# Patient Record
Sex: FEMALE | Race: OTHER | HISPANIC OR LATINO | Employment: UNEMPLOYED | URBAN - NONMETROPOLITAN AREA
[De-identification: names, ages, dates, MRNs, and addresses within clinical notes are randomized per-mention and may not be internally consistent; named-entity substitution may affect disease eponyms.]

---

## 2023-08-31 ENCOUNTER — APPOINTMENT (EMERGENCY)
Dept: RADIOLOGY | Facility: HOSPITAL | Age: 38
End: 2023-08-31
Payer: COMMERCIAL

## 2023-08-31 ENCOUNTER — APPOINTMENT (EMERGENCY)
Dept: CT IMAGING | Facility: HOSPITAL | Age: 38
End: 2023-08-31
Payer: COMMERCIAL

## 2023-08-31 ENCOUNTER — HOSPITAL ENCOUNTER (EMERGENCY)
Facility: HOSPITAL | Age: 38
Discharge: HOME | End: 2023-08-31
Attending: EMERGENCY MEDICINE | Admitting: EMERGENCY MEDICINE
Payer: COMMERCIAL

## 2023-08-31 VITALS
TEMPERATURE: 99 F | HEART RATE: 84 BPM | DIASTOLIC BLOOD PRESSURE: 87 MMHG | SYSTOLIC BLOOD PRESSURE: 162 MMHG | RESPIRATION RATE: 19 BRPM | OXYGEN SATURATION: 98 %

## 2023-08-31 DIAGNOSIS — R07.9 CHEST PAIN: ICD-10-CM

## 2023-08-31 DIAGNOSIS — R10.84 GENERALIZED ABDOMINAL PAIN: Primary | ICD-10-CM

## 2023-08-31 LAB
2HR DELTA HS TROPONIN: 0 NG/L
ALBUMIN SERPL BCP-MCNC: 4.3 G/DL (ref 3.5–5)
ALP SERPL-CCNC: 63 U/L (ref 34–104)
ALT SERPL W P-5'-P-CCNC: 14 U/L (ref 7–52)
ANION GAP SERPL CALCULATED.3IONS-SCNC: 12 MMOL/L
AST SERPL W P-5'-P-CCNC: 29 U/L (ref 13–39)
BASOPHILS # BLD AUTO: 0.04 THOUSANDS/ÂΜL (ref 0–0.1)
BASOPHILS NFR BLD AUTO: 0 % (ref 0–1)
BILIRUB SERPL-MCNC: 0.31 MG/DL (ref 0.2–1)
BILIRUB UR QL STRIP: NEGATIVE
BUN SERPL-MCNC: 10 MG/DL (ref 5–25)
CALCIUM SERPL-MCNC: 10.1 MG/DL (ref 8.4–10.2)
CARDIAC TROPONIN I PNL SERPL HS: 6 NG/L
CARDIAC TROPONIN I PNL SERPL HS: 6 NG/L
CHLORIDE SERPL-SCNC: 97 MMOL/L (ref 96–108)
CLARITY UR: CLEAR
CO2 SERPL-SCNC: 23 MMOL/L (ref 21–32)
COLOR UR: YELLOW
CREAT SERPL-MCNC: 0.82 MG/DL (ref 0.6–1.3)
EOSINOPHIL # BLD AUTO: 0.19 THOUSAND/ÂΜL (ref 0–0.61)
EOSINOPHIL NFR BLD AUTO: 2 % (ref 0–6)
ERYTHROCYTE [DISTWIDTH] IN BLOOD BY AUTOMATED COUNT: 13.6 % (ref 11.6–15.1)
EXT PREGNANCY TEST URINE: NEGATIVE
EXT. CONTROL: NORMAL
FLUAV RNA RESP QL NAA+PROBE: NEGATIVE
FLUBV RNA RESP QL NAA+PROBE: NEGATIVE
GFR SERPL CREATININE-BSD FRML MDRD: 91 ML/MIN/1.73SQ M
GLUCOSE SERPL-MCNC: 100 MG/DL (ref 65–140)
GLUCOSE SERPL-MCNC: 110 MG/DL (ref 65–140)
GLUCOSE UR STRIP-MCNC: NEGATIVE MG/DL
HCT VFR BLD AUTO: 43.6 % (ref 34.8–46.1)
HGB BLD-MCNC: 14.8 G/DL (ref 11.5–15.4)
HGB UR QL STRIP.AUTO: NEGATIVE
IMM GRANULOCYTES # BLD AUTO: 0.02 THOUSAND/UL (ref 0–0.2)
IMM GRANULOCYTES NFR BLD AUTO: 0 % (ref 0–2)
KETONES UR STRIP-MCNC: NEGATIVE MG/DL
LACTATE SERPL-SCNC: 2.1 MMOL/L (ref 0.5–2)
LACTATE SERPL-SCNC: 2.1 MMOL/L (ref 0.5–2)
LEUKOCYTE ESTERASE UR QL STRIP: NEGATIVE
LIPASE SERPL-CCNC: 32 U/L (ref 11–82)
LYMPHOCYTES # BLD AUTO: 3.92 THOUSANDS/ÂΜL (ref 0.6–4.47)
LYMPHOCYTES NFR BLD AUTO: 42 % (ref 14–44)
MCH RBC QN AUTO: 29.4 PG (ref 26.8–34.3)
MCHC RBC AUTO-ENTMCNC: 33.9 G/DL (ref 31.4–37.4)
MCV RBC AUTO: 87 FL (ref 82–98)
MONOCYTES # BLD AUTO: 0.76 THOUSAND/ÂΜL (ref 0.17–1.22)
MONOCYTES NFR BLD AUTO: 8 % (ref 4–12)
NEUTROPHILS # BLD AUTO: 4.39 THOUSANDS/ÂΜL (ref 1.85–7.62)
NEUTS SEG NFR BLD AUTO: 48 % (ref 43–75)
NITRITE UR QL STRIP: NEGATIVE
NRBC BLD AUTO-RTO: 0 /100 WBCS
PH UR STRIP.AUTO: 7 [PH]
PLATELET # BLD AUTO: 408 THOUSANDS/UL (ref 149–390)
PMV BLD AUTO: 9.7 FL (ref 8.9–12.7)
POTASSIUM SERPL-SCNC: 3.9 MMOL/L (ref 3.5–5.3)
PROT SERPL-MCNC: 7.5 G/DL (ref 6.4–8.4)
PROT UR STRIP-MCNC: NEGATIVE MG/DL
RBC # BLD AUTO: 5.04 MILLION/UL (ref 3.81–5.12)
RSV RNA RESP QL NAA+PROBE: NEGATIVE
SARS-COV-2 RNA RESP QL NAA+PROBE: NEGATIVE
SODIUM SERPL-SCNC: 132 MMOL/L (ref 135–147)
SP GR UR STRIP.AUTO: <=1.005 (ref 1–1.03)
UROBILINOGEN UR QL STRIP.AUTO: 0.2 E.U./DL
WBC # BLD AUTO: 9.32 THOUSAND/UL (ref 4.31–10.16)

## 2023-08-31 PROCEDURE — 82948 REAGENT STRIP/BLOOD GLUCOSE: CPT

## 2023-08-31 PROCEDURE — 99285 EMERGENCY DEPT VISIT HI MDM: CPT

## 2023-08-31 PROCEDURE — 96361 HYDRATE IV INFUSION ADD-ON: CPT

## 2023-08-31 PROCEDURE — 96374 THER/PROPH/DIAG INJ IV PUSH: CPT

## 2023-08-31 PROCEDURE — 80053 COMPREHEN METABOLIC PANEL: CPT | Performed by: EMERGENCY MEDICINE

## 2023-08-31 PROCEDURE — 81003 URINALYSIS AUTO W/O SCOPE: CPT | Performed by: EMERGENCY MEDICINE

## 2023-08-31 PROCEDURE — G1004 CDSM NDSC: HCPCS

## 2023-08-31 PROCEDURE — 84484 ASSAY OF TROPONIN QUANT: CPT | Performed by: EMERGENCY MEDICINE

## 2023-08-31 PROCEDURE — 83605 ASSAY OF LACTIC ACID: CPT | Performed by: EMERGENCY MEDICINE

## 2023-08-31 PROCEDURE — 85025 COMPLETE CBC W/AUTO DIFF WBC: CPT | Performed by: EMERGENCY MEDICINE

## 2023-08-31 PROCEDURE — 74177 CT ABD & PELVIS W/CONTRAST: CPT

## 2023-08-31 PROCEDURE — 36415 COLL VENOUS BLD VENIPUNCTURE: CPT | Performed by: EMERGENCY MEDICINE

## 2023-08-31 PROCEDURE — 71045 X-RAY EXAM CHEST 1 VIEW: CPT

## 2023-08-31 PROCEDURE — 96375 TX/PRO/DX INJ NEW DRUG ADDON: CPT

## 2023-08-31 PROCEDURE — 99285 EMERGENCY DEPT VISIT HI MDM: CPT | Performed by: EMERGENCY MEDICINE

## 2023-08-31 PROCEDURE — 83690 ASSAY OF LIPASE: CPT | Performed by: EMERGENCY MEDICINE

## 2023-08-31 PROCEDURE — 81025 URINE PREGNANCY TEST: CPT | Performed by: EMERGENCY MEDICINE

## 2023-08-31 PROCEDURE — 0241U HB NFCT DS VIR RESP RNA 4 TRGT: CPT | Performed by: EMERGENCY MEDICINE

## 2023-08-31 RX ORDER — FAMOTIDINE 20 MG/1
20 TABLET, FILM COATED ORAL 2 TIMES DAILY
Qty: 30 TABLET | Refills: 0 | Status: SHIPPED | OUTPATIENT
Start: 2023-08-31

## 2023-08-31 RX ORDER — KETOROLAC TROMETHAMINE 30 MG/ML
15 INJECTION, SOLUTION INTRAMUSCULAR; INTRAVENOUS ONCE
Status: COMPLETED | OUTPATIENT
Start: 2023-08-31 | End: 2023-08-31

## 2023-08-31 RX ORDER — METOCLOPRAMIDE HYDROCHLORIDE 5 MG/ML
5 INJECTION INTRAMUSCULAR; INTRAVENOUS ONCE
Status: COMPLETED | OUTPATIENT
Start: 2023-08-31 | End: 2023-08-31

## 2023-08-31 RX ADMIN — IOHEXOL 100 ML: 350 INJECTION, SOLUTION INTRAVENOUS at 01:38

## 2023-08-31 RX ADMIN — SODIUM CHLORIDE 1000 ML: 0.9 INJECTION, SOLUTION INTRAVENOUS at 01:14

## 2023-08-31 RX ADMIN — KETOROLAC TROMETHAMINE 15 MG: 30 INJECTION, SOLUTION INTRAMUSCULAR; INTRAVENOUS at 01:25

## 2023-08-31 RX ADMIN — METOCLOPRAMIDE 5 MG: 5 INJECTION, SOLUTION INTRAMUSCULAR; INTRAVENOUS at 01:26

## 2023-08-31 NOTE — DISCHARGE INSTRUCTIONS
You have been seen for chest pain and abdominal pain. Please trial the pepcid as prescribed. Return to the emergency department if you develop worsening pain, vomiting, chest pain, trouble breathing or any other symptoms of concern. Please follow up with your PCP by calling the number provided.

## 2023-08-31 NOTE — ED PROVIDER NOTES
History  Chief Complaint   Patient presents with   • Weakness - Generalized     Started about 1 hour ago, generalized. C/o non radiating 6/10 MSCP/pressure and generalized abdominal pain. Dry heaving upon arrival     Enrique Alvarado is a 41y.o. year old female with PMH of HTN presenting to the [facility] ED for abdominal pain and chest pain. Patient reporting sudden onset of generalized abdominal pain and chest pain one hour prior to arrival. Pain starts in abdomen and radiates superiorly. She feels nauseous but has not vomited. She also feels generalized weakness. No reported fevers/chills. No cough, congestion or sore throat. No reported sick contacts. Patient reportedly in normal state of health earlier today. Patient has taken tylenol at home for symptomatic treatment. Surgical history notable for cholecystectomy. She reports chronic diarrhea after undergoing cholecystectomy. History provided by:  Medical records, patient and spouse   used: Yes        Prior to Admission Medications   Prescriptions Last Dose Informant Patient Reported? Taking?   carvedilol (COREG) 6.25 mg tablet   Yes No   Sig: Take 6.25 mg by mouth   losartan (COZAAR) 100 MG tablet   Yes No   Sig: Take 100 mg by mouth daily      Facility-Administered Medications: None       Past Medical History:   Diagnosis Date   • Hypertension        History reviewed. No pertinent surgical history. History reviewed. No pertinent family history. I have reviewed and agree with the history as documented. E-Cigarette/Vaping   • E-Cigarette Use Never User      E-Cigarette/Vaping Substances     Social History     Tobacco Use   • Smoking status: Never   • Smokeless tobacco: Never   Vaping Use   • Vaping Use: Never used   Substance Use Topics   • Alcohol use: Never   • Drug use: Never       Review of Systems   Constitutional: Positive for fatigue. Negative for chills and fever. HENT: Negative for congestion.     Respiratory: Negative for shortness of breath. Cardiovascular: Positive for chest pain. Gastrointestinal: Positive for abdominal pain, diarrhea (chronic) and nausea. Negative for vomiting. Genitourinary: Negative for dysuria, flank pain and hematuria. Musculoskeletal: Negative for arthralgias and neck pain. Skin: Negative for rash. Neurological: Positive for light-headedness. Negative for weakness. Psychiatric/Behavioral: Negative for confusion. All other systems reviewed and are negative. Physical Exam  Physical Exam  Vitals and nursing note reviewed. Constitutional:       General: She is not in acute distress. Appearance: She is well-developed. She is ill-appearing (appears uncomfortable). She is not toxic-appearing or diaphoretic. HENT:      Head: Normocephalic and atraumatic. Nose: No congestion or rhinorrhea. Eyes:      General:         Right eye: No discharge. Left eye: No discharge. Extraocular Movements: Extraocular movements intact. Pupils: Pupils are equal, round, and reactive to light. Cardiovascular:      Rate and Rhythm: Normal rate and regular rhythm. Pulmonary:      Effort: Pulmonary effort is normal. No accessory muscle usage or respiratory distress. Breath sounds: Normal breath sounds. No stridor. No decreased breath sounds, wheezing, rhonchi or rales. Abdominal:      General: There is no distension. Palpations: Abdomen is soft. Tenderness: There is abdominal tenderness in the epigastric area. There is no right CVA tenderness, left CVA tenderness, guarding or rebound. Musculoskeletal:      Cervical back: Normal range of motion and neck supple. No rigidity. Right lower leg: No tenderness. No edema. Left lower leg: No tenderness. No edema. Skin:     Capillary Refill: Capillary refill takes less than 2 seconds. Findings: No rash. Neurological:      Mental Status: She is alert and oriented to person, place, and time. GCS: GCS eye subscore is 4. GCS verbal subscore is 5. GCS motor subscore is 6. Cranial Nerves: No dysarthria or facial asymmetry. Comments: 5/5 strength b/l UE/LE. Sensation grossly intact throughout. Psychiatric:         Mood and Affect: Mood normal.         Behavior: Behavior normal.         Vital Signs  ED Triage Vitals [08/31/23 0051]   Temperature Pulse Respirations Blood Pressure SpO2   99 °F (37.2 °C) 80 (!) 35 (!) 190/120 97 %      Temp Source Heart Rate Source Patient Position - Orthostatic VS BP Location FiO2 (%)   Tympanic Monitor Lying Left arm --      Pain Score       --           Vitals:    08/31/23 0051 08/31/23 0345 08/31/23 0432   BP: (!) 190/120 162/87    Pulse: 80 79 84   Patient Position - Orthostatic VS: Lying Lying          Visual Acuity      ED Medications  Medications   sodium chloride 0.9 % bolus 1,000 mL (0 mL Intravenous Stopped 8/31/23 0450)   ketorolac (TORADOL) injection 15 mg (15 mg Intravenous Given 8/31/23 0125)   metoclopramide (REGLAN) injection 5 mg (5 mg Intravenous Given 8/31/23 0126)   iohexol (OMNIPAQUE) 350 MG/ML injection (SINGLE-DOSE) 100 mL (100 mL Intravenous Given 8/31/23 0138)       Diagnostic Studies  Results Reviewed     Procedure Component Value Units Date/Time    HS Troponin I 2hr [[id number]]  (Normal) Collected: 08/31/23 0346    Lab Status: Final result Specimen: Blood from Hand, Left Updated: 08/31/23 0412     hs TnI 2hr 6 ng/L      Delta 2hr hsTnI 0 ng/L     Lactic acid 2 Hours [[id number]]  (Abnormal) Collected: 08/31/23 0346    Lab Status: Final result Specimen: Blood from Hand, Left Updated: 08/31/23 0409     LACTIC ACID 2.1 mmol/L     Narrative:      Result may be elevated if tourniquet was used during collection.     Lactic acid, plasma (w/reflex if result > 2.0) [[id number]]  (Abnormal) Collected: 08/31/23 0058    Lab Status: Final result Specimen: Blood from Arm, Left Updated: 08/31/23 0142     LACTIC ACID 2.1 mmol/L     Narrative:      Result may be elevated if tourniquet was used during collection. Comprehensive metabolic panel [[id number]]  (Abnormal) Collected: 08/31/23 0058    Lab Status: Final result Specimen: Blood from Arm, Left Updated: 08/31/23 0139     Sodium 132 mmol/L      Potassium 3.9 mmol/L      Chloride 97 mmol/L      CO2 23 mmol/L      ANION GAP 12 mmol/L      BUN 10 mg/dL      Creatinine 0.82 mg/dL      Glucose 100 mg/dL      Calcium 10.1 mg/dL      AST 29 U/L      ALT 14 U/L      Alkaline Phosphatase 63 U/L      Total Protein 7.5 g/dL      Albumin 4.3 g/dL      Total Bilirubin 0.31 mg/dL      eGFR 91 ml/min/1.73sq m     Narrative:      [place] guidelines for Chronic Kidney Disease (CKD):   •  Stage 1 with normal or high GFR (GFR > 90 mL/min/1.73 square meters)  •  Stage 2 Mild CKD (GFR = 60-89 mL/min/1.73 square meters)  •  Stage 3A Moderate CKD (GFR = 45-59 mL/min/1.73 square meters)  •  Stage 3B Moderate CKD (GFR = 30-44 mL/min/1.73 square meters)  •  Stage 4 Severe CKD (GFR = 15-29 mL/min/1.73 square meters)  •  Stage 5 End Stage CKD (GFR <15 mL/min/1.73 square meters)  Note: GFR calculation is accurate only with a steady state creatinine    Lipase [[id number]]  (Normal) Collected: 08/31/23 0058    Lab Status: Final result Specimen: Blood from Arm, Left Updated: 08/31/23 0139     Lipase 32 u/L     FLU/RSV/COVID - if FLU/RSV clinically relevant [[id number]]  (Normal) Collected: 08/31/23 0042    Lab Status: Final result Specimen: Nares from Nose Updated: 08/31/23 0132     SARS-CoV-2 Negative     INFLUENZA A PCR Negative     INFLUENZA B PCR Negative     RSV PCR Negative    Narrative:      FOR PEDIATRIC PATIENTS - copy/paste COVID Guidelines URL to browser: [email or web address]    SARS-CoV-2 assay is a Nucleic Acid Amplification assay intended for the  qualitative detection of nucleic acid from SARS-CoV-2 in nasopharyngeal  swabs.  Results are for the presumptive identification of SARS-CoV-2 RNA. Positive results are indicative of infection with SARS-CoV-2, the virus  causing COVID-19, but do not rule out bacterial infection or co-infection  with other viruses. Laboratories within the [facility] and its  territories are required to report all positive results to the appropriate  public health authorities. Negative results do not preclude SARS-CoV-2  infection and should not be used as the sole basis for treatment or other  patient management decisions. Negative results must be combined with  clinical observations, patient history, and epidemiological information. This test has not been FDA cleared or approved. This test has been authorized by FDA under an Emergency Use Authorization  (EUA). This test is only authorized for the duration of time the  declaration that circumstances exist justifying the authorization of the  emergency use of an in vitro diagnostic tests for detection of SARS-CoV-2  virus and/or diagnosis of COVID-19 infection under section 564(b)(1) of  the Act, 21 U. S.C. 110QWJ-4(B)(9), unless the authorization is terminated  or revoked sooner. The test has been validated but independent review by FDA  and CLIA is pending. Test performed using [employer] Gene[employer]pert: This RT-PCR assay targets N2,  a region unique to SARS-CoV-2. A conserved region in the E-gene was chosen  for pan-Sarbecovirus detection which includes SARS-CoV-2. According to CMS-2020-01-R, this platform meets the definition of high-throughput technology.     HS Troponin 0hr (reflex protocol) [[id number]]  (Normal) Collected: 08/31/23 0058    Lab Status: Final result Specimen: Blood from Arm, Left Updated: 08/31/23 0131     hs TnI 0hr 6 ng/L     Fingerstick Glucose (POCT) [[id number]]  (Normal) Collected: 08/31/23 0038    Lab Status: Final result Updated: 08/31/23 0116     POC Glucose 110 mg/dl     CBC and differential [[id number]]  (Abnormal) Collected: 08/31/23 0058    Lab Status: Final result Specimen: Blood from Arm, Left Updated: 08/31/23 0108     WBC 9.32 Thousand/uL      RBC 5.04 Million/uL      Hemoglobin 14.8 g/dL      Hematocrit 43.6 %      MCV 87 fL      MCH 29.4 pg      MCHC 33.9 g/dL      RDW 13.6 %      MPV 9.7 fL      Platelets 781 Thousands/uL      nRBC 0 /100 WBCs      Neutrophils Relative 48 %      Immat GRANS % 0 %      Lymphocytes Relative 42 %      Monocytes Relative 8 %      Eosinophils Relative 2 %      Basophils Relative 0 %      Neutrophils Absolute 4.39 Thousands/µL      Immature Grans Absolute 0.02 Thousand/uL      Lymphocytes Absolute 3.92 Thousands/µL      Monocytes Absolute 0.76 Thousand/µL      Eosinophils Absolute 0.19 Thousand/µL      Basophils Absolute 0.04 Thousands/µL     UA w Reflex to Microscopic w Reflex to Culture [[id number]] Collected: 08/31/23 0047    Lab Status: Final result Specimen: Urine, Clean Catch Updated: 08/31/23 0104     Color, UA Yellow     Clarity, UA Clear     Specific Gravity, UA <=1.005     pH, UA 7.0     Leukocytes, UA Negative     Nitrite, UA Negative     Protein, UA Negative mg/dl      Glucose, UA Negative mg/dl      Ketones, UA Negative mg/dl      Urobilinogen, UA 0.2 E.U./dl      Bilirubin, UA Negative     Occult Blood, UA Negative    POCT pregnancy, urine [[id number]]  (Normal) Resulted: 08/31/23 0047    Lab Status: Final result Updated: 08/31/23 0047     EXT Preg Test, Ur Negative     Control Valid                 CT abdomen pelvis with contrast   Final Result by Michael Umana MD (08/31 4487)      No acute intra-abdominal/pelvic abnormalities noted with findings detailed above. Workstation performed: [id number]         XR chest portable   ED Interpretation by Shane Jorge DO (08/31 0124)   No acute cardiopulmonary disease. Final Result by Patty Bello MD (92/26 6774)      No acute cardiopulmonary disease.                   Workstation performed: [id number]                    Procedures  Procedures ED Course  ED Course as of 09/01/23 0225   Thu Aug 31, 2023   4650 Procedure Note: EKG  Date/Time: 08/31/23 1:15 AM   Interpreted by: Janine Garcia DO  Indications / Diagnosis: Chest/abdominal pain  ECG reviewed by me, the ED Provider: yes   The EKG demonstrates:  Rhythm: normal sinus rhythm 81 BPM  Intervals: Normal FL and QT intervals  Axis: Normal axis  QRS/Blocks: Normal QRS  ST Changes: No acute ST/T waves changes. No TUCKER. No TWI.   0224 Reassessed. Pain improved. Pending CT for dispo. 7799 Patient reassessed. She denies any chest or abdominal pain at this time. Ambulatory in ED without issue. Reviewed labs, UA and CT results with patient. Unclear etiology of symptoms, possibly related to gastritis. Will treat symptomatically and encourage outpatient PCP f/u. HEART Risk Score    Flowsheet Row Most Recent Value   Heart Score Risk Calculator    History 0 Filed at: 08/31/2023 0415   ECG 0 Filed at: 08/31/2023 0415   Age 0 Filed at: 08/31/2023 0415   Risk Factors 1 Filed at: 08/31/2023 0415   Troponin 0 Filed at: 08/31/2023 0415   HEART Score 1 Filed at: 08/31/2023 0415                        SBIRT 20yo+    Flowsheet Row Most Recent Value   Initial Alcohol Screen: US AUDIT-C     1. How often do you have a drink containing alcohol? 0 Filed at: 08/31/2023 0112   2. How many drinks containing alcohol do you have on a typical day you are drinking? 0 Filed at: 08/31/2023 0051   Audit-C Score 0 Filed at: 08/31/2023 6861   DMITRY: How many times in the past year have you. .. Used an illegal drug or used a prescription medication for non-medical reasons? Never Filed at: 08/31/2023 0112                    Medical Decision Making    41 y.o. female presenting for chest and abdominal pain. Will order CBC, CMP, troponin, EKG, CXR to evaluate for arrhythmia, ACS, PTX, pulmonary disease, widened mediastinum. Given age I do not suspect aortic dissection.  In absence of tachycardia or hypoxia and no DVT symptoms I do not suspect PE. Will order labs, UA and CT to evaluate for hepatitis, pancreatitis, appendicitis, bowel obstruction, diverticulitis, aortic aneurysm, retroperitoneal hematoma/hemorrhage, intraabdominal perforation/abscess, kidney stone or ovarian cyst/torsion. Reassessment: pain resolved, patient laying in bed comfortably. Ambulatory in ED to bathroom. After diagnostic lab testing and imaging, no definitive abnormality was noted that would explain the patient's symptoms. I explained to the patient that although no definitive diagnosis could be made today, the possibility exists that it may be too early in the disease process to diagnose serious illness. Discussed alternative etiology including gastritis or viral syndrome. Disposition: I have discussed with the patient our plan to discharge them from the ED and the patient is in agreement with this plan. Discharge Plan: Rx for pepcid, oral hydration, continue home medication regimen. RTED precautions emphasized. The patient was provided a written after visit summary with strict RTED precautions. Followup: I have discussed with the patient plan to follow up with their PCP. Contact information provided in AVS.        Amount and/or Complexity of Data Reviewed  Labs: ordered. Radiology: ordered and independent interpretation performed. Risk  Prescription drug management. Disposition  Final diagnoses:   Generalized abdominal pain   Chest pain     Time reflects when diagnosis was documented in both MDM as applicable and the Disposition within this note     Time User Action Codes Description Comment    8/31/2023  4:20 AM Bipin Kwan Add [R10.84] Generalized abdominal pain     8/31/2023  4:20 AM Bipin Kwan Add [R07.9] Chest pain       ED Disposition     ED Disposition   Discharge    Condition   Stable    Date/Time   Thu Aug 31, 2023  4:20 AM    Comment   Yousif Moreno discharge to home/self care. Follow-up Information     Follow up With Specialties Details Why RossyTherese MD [facility] Schedule an appointment as soon as possible for a visit  To make appointment for reevaluation in 3-5 days. [address] [place] [address]  [phone]            Discharge Medication List as of 8/31/2023  4:28 AM      START taking these medications    Details   famotidine (PEPCID) 20 mg tablet Take 1 tablet (20 mg total) by mouth 2 (two) times a day, Starting Thu 8/31/2023, Normal         CONTINUE these medications which have NOT CHANGED    Details   carvedilol (COREG) 6.25 mg tablet Take 6.25 mg by mouth, Historical Med      losartan (COZAAR) 100 MG tablet Take 100 mg by mouth daily, Historical Med             No discharge procedures on file.     PDMP Review     None          ED Provider  Electronically Signed by           Becca Mayo DO  09/01/23 5595

## 2023-12-27 ENCOUNTER — OFFICE VISIT (OUTPATIENT)
Dept: URGENT CARE | Facility: MEDICAL CENTER | Age: 38
End: 2023-12-27
Payer: COMMERCIAL

## 2023-12-27 VITALS
TEMPERATURE: 98 F | OXYGEN SATURATION: 97 % | SYSTOLIC BLOOD PRESSURE: 108 MMHG | DIASTOLIC BLOOD PRESSURE: 66 MMHG | RESPIRATION RATE: 20 BRPM | HEART RATE: 103 BPM

## 2023-12-27 DIAGNOSIS — R50.81 FEVER IN OTHER DISEASES: Primary | ICD-10-CM

## 2023-12-27 LAB
SARS-COV-2 AG UPPER RESP QL IA: POSITIVE
VALID CONTROL: ABNORMAL

## 2023-12-27 PROCEDURE — 87811 SARS-COV-2 COVID19 W/OPTIC: CPT

## 2023-12-27 PROCEDURE — 99212 OFFICE O/P EST SF 10 MIN: CPT

## 2023-12-27 PROCEDURE — S9088 SERVICES PROVIDED IN URGENT: HCPCS

## 2023-12-27 RX ORDER — DILTIAZEM HYDROCHLORIDE 240 MG/1
240 CAPSULE, EXTENDED RELEASE ORAL DAILY
COMMUNITY

## 2023-12-27 NOTE — PATIENT INSTRUCTIONS
You may take over the counter Tylenol (Acetaminophen) and/or Motrin (Ibuprofen) as needed, as directed on packaging.   Be sure to get plenty of rest, and drinking fluids to remain hydrated.     Please follow up with your primary provider in the next several days. Should you have any worsening of symptoms, or lack of improvement please be re-evaluated. If needed for significant concerns, consider 911 or ER evaluation.     COVID-19 Key Points  People who are infected but asymptomatic or people with mild COVID-19 should isolate through at least day 5 (day 0 is the day symptoms appeared or the date the specimen was collected for the positive test for people who are asymptomatic). They should wear a mask through day 10. A test-based strategy may be used to remove a mask sooner.  People with moderate or severe COVID-19 should isolate through at least day 10. Those with severe COVID-19 may remain infectious beyond 10 days and may need to extend isolation for up to 20 days.  People who are moderately or severely immunocompromised should isolate through at least day 20. Use of serial testing and consultation with an infectious disease specialist is recommended in these patients prior to ending isolation.  Isolation can be discontinued at least 5 days after symptom onset (day 0 is the day symptoms appeared, and day 1 is the next full day thereafter) if fever has resolved for at least 24 hours (without taking fever-reducing medications) and other symptoms are improving.  Loss of taste and smell may persist for weeks or months after recovery and need not delay the end of isolation.  A high-quality mask should be worn around others at home and in public through day 10. If symptoms recur or worsen, the isolation period should restart at day 0.    The unnecessary use of antibiotics can have harmful affect, unwanted side-effects and can lead to antibiotic resistant bacteria in the future. You are being treated today for a viral  illness. Viral illnesses do not require antibiotics, and are treated symptomatically.   According to the Centers for Disease Control and Prevention, about one-third of antibiotic use in the outpatient setting, is not needed nor appropriate. Antibiotics treat infections caused by bacteria. But they don't treat infections caused by viruses (viral infections). For example, an antibiotic is the correct treatment for strep throat, which is caused by bacteria. But it's not the right treatment for most sore throats, which are caused by viruses.By being proactive and treating your individual symptoms, this may help you feel better.     You may have had testing done today which when completed and resulted may change the course of your treatment. It is at that time that if a change in your treatment is necessary that you will hear from our office. I would also recommend you follow up with your primary care provider in the next few days.

## 2023-12-27 NOTE — PROGRESS NOTES
[facility]        NAME: Katherine Esqueda is a 38 y.o. female  : 1985    MRN: [id number]  DATE: 2023  TIME: 1:33 PM    Assessment and Plan   Fever in other diseases [R50.81]  1. Fever in other diseases  Poct Covid 19 Rapid Antigen Test            Patient Instructions       Follow up with PCP in 3-5 days.  Proceed to  ER if symptoms worsen.    Chief Complaint     Chief Complaint   Patient presents with   • Fever     33 celsius   • Generalized Body Aches   • Fatigue   • Vomiting     Vomited 3 times today   • Abdominal Pain   • Nasal Congestion     All SX started yesterday   • Hip Pain     No trauma to area. Started yesterday         History of Present Illness       Patient here today with symptoms which started yesterday.  Reports fever generalized bodyaches fatigue vomiting abdominal pain sinus congestion and hip pain. At home has not been taking anything for her symptoms other than a dose of tylenol. Advised to take OTC medicines to treat her symptoms. Patient reports nausea but reports allergy to zofran and benadryl has used dramamine in the past with improvement of nausea. Strict ER precautions reviewed.       Review of Systems   Review of Systems   Constitutional:  Positive for chills, fatigue and fever.   HENT:  Positive for congestion. Negative for ear pain, postnasal drip, rhinorrhea, sinus pressure, sinus pain, sore throat and trouble swallowing.    Respiratory:  Positive for shortness of breath. Negative for cough.    Cardiovascular:  Negative for chest pain and palpitations.   Gastrointestinal:  Positive for diarrhea, nausea and vomiting. Negative for abdominal pain and constipation.   Musculoskeletal:  Positive for back pain and myalgias. Negative for arthralgias.   Skin:  Negative for color change and rash.   Neurological:  Positive for headaches. Negative for dizziness and light-headedness.   All other systems reviewed and are negative.        Current Medications        Current Outpatient Medications:   •  carvedilol (COREG) 6.25 mg tablet, Take 6.25 mg by mouth, Disp: , Rfl:   •  diltiazem (Dilt-XR) 240 MG 24 hr capsule, Take 240 mg by mouth daily, Disp: , Rfl:   •  losartan (COZAAR) 100 MG tablet, Take 100 mg by mouth daily, Disp: , Rfl:     Current Allergies     Allergies as of 12/27/2023 - Reviewed 12/27/2023   Allergen Reaction Noted   • Sulfa antibiotics Anaphylaxis 08/23/2017   • Aspirin Edema 01/12/2015   • Benadryl [diphenhydramine] Blisters 12/27/2023   • Ondansetron Hives 04/06/2017            The following portions of the patient's history were reviewed and updated as appropriate: allergies, current medications, past family history, past medical history, past social history, past surgical history and problem list.     Past Medical History:   Diagnosis Date   • Hyperlipidemia    • Hypertension        History reviewed. No pertinent surgical history.    History reviewed. No pertinent family history.      Medications have been verified.        Objective   /66   Pulse 103   Temp 98 °F (36.7 °C)   Resp 20   SpO2 97%        Physical Exam     Physical Exam  Vitals and nursing note reviewed.   Constitutional:       General: She is not in acute distress.     Appearance: Normal appearance. She is well-developed and normal weight. She is ill-appearing.   HENT:      Head: Normocephalic and atraumatic.      Right Ear: Tympanic membrane, ear canal and external ear normal.      Left Ear: Tympanic membrane, ear canal and external ear normal.      Nose: Nose normal.      Mouth/Throat:      Mouth: Mucous membranes are moist.      Pharynx: Oropharynx is clear.   Eyes:      Extraocular Movements: Extraocular movements intact.      Conjunctiva/sclera: Conjunctivae normal.      Pupils: Pupils are equal, round, and reactive to light.   Cardiovascular:      Rate and Rhythm: Normal rate and regular rhythm.      Pulses: Normal pulses.      Heart sounds: Normal heart sounds.    Pulmonary:      Effort: Pulmonary effort is normal.      Breath sounds: Normal breath sounds. No decreased breath sounds, wheezing, rhonchi or rales.   Musculoskeletal:         General: Normal range of motion.      Cervical back: Normal range of motion and neck supple.   Skin:     General: Skin is warm.      Capillary Refill: Capillary refill takes less than 2 seconds.   Neurological:      General: No focal deficit present.      Mental Status: She is alert and oriented to person, place, and time.   Psychiatric:         Mood and Affect: Mood normal.         Behavior: Behavior normal. Behavior is cooperative.

## 2024-05-06 ENCOUNTER — OFFICE VISIT (OUTPATIENT)
Dept: URGENT CARE | Facility: CLINIC | Age: 39
End: 2024-05-06
Payer: COMMERCIAL

## 2024-05-06 VITALS
HEART RATE: 93 BPM | TEMPERATURE: 98.3 F | RESPIRATION RATE: 18 BRPM | DIASTOLIC BLOOD PRESSURE: 110 MMHG | HEIGHT: 62 IN | OXYGEN SATURATION: 99 % | SYSTOLIC BLOOD PRESSURE: 174 MMHG | BODY MASS INDEX: 33.68 KG/M2 | WEIGHT: 183 LBS

## 2024-05-06 DIAGNOSIS — S46.211A BICEPS MUSCLE STRAIN, RIGHT, INITIAL ENCOUNTER: Primary | ICD-10-CM

## 2024-05-06 PROCEDURE — S9088 SERVICES PROVIDED IN URGENT: HCPCS

## 2024-05-06 PROCEDURE — 99213 OFFICE O/P EST LOW 20 MIN: CPT

## 2024-05-06 RX ORDER — PREDNISONE 20 MG/1
20 TABLET ORAL DAILY
Qty: 5 TABLET | Refills: 0 | Status: SHIPPED | OUTPATIENT
Start: 2024-05-06 | End: 2024-05-06

## 2024-05-06 RX ORDER — PREDNISONE 20 MG/1
20 TABLET ORAL DAILY
Qty: 5 TABLET | Refills: 0 | Status: SHIPPED | OUTPATIENT
Start: 2024-05-06 | End: 2024-05-11

## 2024-05-06 NOTE — PROGRESS NOTES
[facility]        NAME: Katherine Esqueda is a 38 y.o. female  : 1985    MRN: [id number]  DATE: May 6, 2024  TIME: 6:37 PM    Assessment and Plan   Biceps muscle strain, right, initial encounter [S46.211A]  1. Biceps muscle strain, right, initial encounter  predniSONE 20 mg tablet    DISCONTINUED: predniSONE 20 mg tablet      Cannot r/o bicep tear due to presentation will try steroid trial and recommended to follow up with pcp if no improvement in 5 days for possible MRI    Patient Instructions   Ice 20 min on 20 off for next 24 hours, then heat as tolerated  Prednisone as directed  Bicep exercises as discussed  Tylenol for pain  Follow up with PCP in 3-5 days.  Proceed to  ER if symptoms worsen.    If tests are performed, our office will contact you with results only if changes need to made to the care plan discussed with you at the visit. You can review your full results on [facility].    Chief Complaint     Chief Complaint   Patient presents with    Arm Pain     Right side arm pain. symptoms STARTED 4 DAYS AGO AND MAY HAVE LIFTED SOMETHING NOT SURE TO MAKE IT HURT          History of Present Illness       4 days ago patient reports pain in upper right arm radiating to elbow rated 7/10 in severity and weakness. Patient reports she does a lot of work with her hands and may have been doing some heavy lifting. Patient reports she has a HTN and cannot take ibuprofen per her PCP.         Review of Systems   Review of Systems   Musculoskeletal:  Positive for myalgias.         Current Medications       Current Outpatient Medications:     carvedilol (COREG) 6.25 mg tablet, Take 6.25 mg by mouth, Disp: , Rfl:     diltiazem (Dilt-XR) 240 MG 24 hr capsule, Take 240 mg by mouth daily, Disp: , Rfl:     losartan (COZAAR) 100 MG tablet, Take 100 mg by mouth daily, Disp: , Rfl:     predniSONE 20 mg tablet, Take 1 tablet (20 mg total) by mouth daily for 5 days, Disp: 5 tablet, Rfl: 0    Current  "Allergies     Allergies as of 05/06/2024 - Reviewed 05/06/2024   Allergen Reaction Noted    Sulfa antibiotics Anaphylaxis 08/23/2017    Aspirin Edema 01/12/2015    Benadryl [diphenhydramine] Blisters 12/27/2023    Ondansetron Hives 04/06/2017            The following portions of the patient's history were reviewed and updated as appropriate: allergies, current medications, past family history, past medical history, past social history, past surgical history and problem list.     Past Medical History:   Diagnosis Date    Hyperlipidemia     Hypertension        History reviewed. No pertinent surgical history.    History reviewed. No pertinent family history.      Medications have been verified.        Objective   BP (!) 174/110   Pulse 93   Temp 98.3 °F (36.8 °C)   Resp 18   Ht 5' 2\" (1.575 m)   Wt 83 kg (183 lb)   SpO2 99%   BMI 33.47 kg/m²        Physical Exam     Physical Exam  Vitals and nursing note reviewed.   Cardiovascular:      Rate and Rhythm: Normal rate and regular rhythm.      Pulses: Normal pulses.      Heart sounds: Normal heart sounds.   Pulmonary:      Effort: Pulmonary effort is normal. No respiratory distress.      Breath sounds: Normal breath sounds.   Musculoskeletal:      Comments: Right arm with tenderness to palpation posterior bicep. Patient with some swelling to right bicep area both posterior and anterior. No visible distortion of right bicep when compared to left. Right hand weaker with grasp compared to left. Drop arm test negative.                   "

## 2024-05-06 NOTE — PATIENT INSTRUCTIONS
Ice 20 min on 20 off for next 24 hours, then heat as tolerated  Prednisone as directed  Bicep exercises as discussed  Tylenol for pain  Follow up with PCP in 3-5 days.  Proceed to  ER if symptoms worsen.    If tests are performed, our office will contact you with results only if changes need to made to the care plan discussed with you at the visit. You can review your full results on [facility].

## 2024-10-14 ENCOUNTER — APPOINTMENT (EMERGENCY)
Dept: CT IMAGING | Facility: HOSPITAL | Age: 39
End: 2024-10-14
Payer: COMMERCIAL

## 2024-10-14 ENCOUNTER — HOSPITAL ENCOUNTER (EMERGENCY)
Facility: HOSPITAL | Age: 39
Discharge: HOME | End: 2024-10-14
Attending: EMERGENCY MEDICINE | Admitting: EMERGENCY MEDICINE
Payer: COMMERCIAL

## 2024-10-14 VITALS
WEIGHT: 183 LBS | BODY MASS INDEX: 33.68 KG/M2 | TEMPERATURE: 97.6 F | SYSTOLIC BLOOD PRESSURE: 166 MMHG | HEART RATE: 78 BPM | RESPIRATION RATE: 20 BRPM | OXYGEN SATURATION: 97 % | DIASTOLIC BLOOD PRESSURE: 107 MMHG | HEIGHT: 62 IN

## 2024-10-14 DIAGNOSIS — N83.209 RUPTURED OVARIAN CYST: Primary | ICD-10-CM

## 2024-10-14 DIAGNOSIS — J18.9 PNEUMONIA OF LEFT LUNG DUE TO INFECTIOUS ORGANISM: ICD-10-CM

## 2024-10-14 LAB
ALBUMIN SERPL BCG-MCNC: 4.2 G/DL (ref 3.5–5)
ALP SERPL-CCNC: 63 U/L (ref 34–104)
ALT SERPL W P-5'-P-CCNC: 20 U/L (ref 7–52)
ANION GAP SERPL CALCULATED.3IONS-SCNC: 10 MMOL/L (ref 4–13)
AST SERPL W P-5'-P-CCNC: 18 U/L (ref 13–39)
BACTERIA UR QL AUTO: ABNORMAL /HPF
BASOPHILS # BLD AUTO: 0.09 THOUSANDS/ΜL (ref 0–0.1)
BASOPHILS NFR BLD AUTO: 1 % (ref 0–1)
BILIRUB SERPL-MCNC: 0.3 MG/DL (ref 0.2–1)
BILIRUB UR QL STRIP: NEGATIVE
BUN SERPL-MCNC: 14 MG/DL (ref 5–25)
CALCIUM SERPL-MCNC: 9.5 MG/DL (ref 8.4–10.2)
CHLORIDE SERPL-SCNC: 102 MMOL/L (ref 96–108)
CLARITY UR: CLEAR
CO2 SERPL-SCNC: 25 MMOL/L (ref 21–32)
COLOR UR: YELLOW
CREAT SERPL-MCNC: 0.76 MG/DL (ref 0.6–1.3)
EOSINOPHIL # BLD AUTO: 0.27 THOUSAND/ΜL (ref 0–0.61)
EOSINOPHIL NFR BLD AUTO: 3 % (ref 0–6)
ERYTHROCYTE [DISTWIDTH] IN BLOOD BY AUTOMATED COUNT: 13.2 % (ref 11.6–15.1)
EXT PREGNANCY TEST URINE: NEGATIVE
EXT. CONTROL: NORMAL
GFR SERPL CREATININE-BSD FRML MDRD: 99 ML/MIN/1.73SQ M
GLUCOSE SERPL-MCNC: 113 MG/DL (ref 65–140)
GLUCOSE UR STRIP-MCNC: NEGATIVE MG/DL
HCT VFR BLD AUTO: 38.3 % (ref 34.8–46.1)
HCT VFR BLD AUTO: 38.7 % (ref 34.8–46.1)
HGB BLD-MCNC: 12.8 G/DL (ref 11.5–15.4)
HGB BLD-MCNC: 12.9 G/DL (ref 11.5–15.4)
HGB UR QL STRIP.AUTO: NEGATIVE
IMM GRANULOCYTES # BLD AUTO: 0.05 THOUSAND/UL (ref 0–0.2)
IMM GRANULOCYTES NFR BLD AUTO: 1 % (ref 0–2)
KETONES UR STRIP-MCNC: NEGATIVE MG/DL
LEUKOCYTE ESTERASE UR QL STRIP: NEGATIVE
LIPASE SERPL-CCNC: 20 U/L (ref 11–82)
LYMPHOCYTES # BLD AUTO: 2.53 THOUSANDS/ΜL (ref 0.6–4.47)
LYMPHOCYTES NFR BLD AUTO: 28 % (ref 14–44)
MCH RBC QN AUTO: 29.3 PG (ref 26.8–34.3)
MCHC RBC AUTO-ENTMCNC: 33.3 G/DL (ref 31.4–37.4)
MCV RBC AUTO: 88 FL (ref 82–98)
MONOCYTES # BLD AUTO: 0.62 THOUSAND/ΜL (ref 0.17–1.22)
MONOCYTES NFR BLD AUTO: 7 % (ref 4–12)
MUCOUS THREADS UR QL AUTO: ABNORMAL
NEUTROPHILS # BLD AUTO: 5.42 THOUSANDS/ΜL (ref 1.85–7.62)
NEUTS SEG NFR BLD AUTO: 60 % (ref 43–75)
NITRITE UR QL STRIP: NEGATIVE
NON-SQ EPI CELLS URNS QL MICRO: ABNORMAL /HPF
NRBC BLD AUTO-RTO: 0 /100 WBCS
OTHER CASTS: ABNORMAL
PH UR STRIP.AUTO: 6.5 [PH]
PLATELET # BLD AUTO: 366 THOUSANDS/UL (ref 149–390)
PMV BLD AUTO: 9.1 FL (ref 8.9–12.7)
POTASSIUM SERPL-SCNC: 3.6 MMOL/L (ref 3.5–5.3)
PROT SERPL-MCNC: 7.2 G/DL (ref 6.4–8.4)
PROT UR STRIP-MCNC: ABNORMAL MG/DL
RBC # BLD AUTO: 4.41 MILLION/UL (ref 3.81–5.12)
RBC #/AREA URNS AUTO: ABNORMAL /HPF
SODIUM SERPL-SCNC: 137 MMOL/L (ref 135–147)
SP GR UR STRIP.AUTO: 1.02 (ref 1–1.03)
UROBILINOGEN UR STRIP-ACNC: <2 MG/DL
WBC # BLD AUTO: 8.98 THOUSAND/UL (ref 4.31–10.16)
WBC #/AREA URNS AUTO: ABNORMAL /HPF

## 2024-10-14 PROCEDURE — 93005 ELECTROCARDIOGRAM TRACING: CPT

## 2024-10-14 PROCEDURE — 85025 COMPLETE CBC W/AUTO DIFF WBC: CPT | Performed by: EMERGENCY MEDICINE

## 2024-10-14 PROCEDURE — 85018 HEMOGLOBIN: CPT | Performed by: EMERGENCY MEDICINE

## 2024-10-14 PROCEDURE — 81025 URINE PREGNANCY TEST: CPT | Performed by: EMERGENCY MEDICINE

## 2024-10-14 PROCEDURE — 74177 CT ABD & PELVIS W/CONTRAST: CPT

## 2024-10-14 PROCEDURE — 83690 ASSAY OF LIPASE: CPT | Performed by: EMERGENCY MEDICINE

## 2024-10-14 PROCEDURE — 36415 COLL VENOUS BLD VENIPUNCTURE: CPT | Performed by: EMERGENCY MEDICINE

## 2024-10-14 PROCEDURE — 99284 EMERGENCY DEPT VISIT MOD MDM: CPT

## 2024-10-14 PROCEDURE — 80053 COMPREHEN METABOLIC PANEL: CPT | Performed by: EMERGENCY MEDICINE

## 2024-10-14 PROCEDURE — 99285 EMERGENCY DEPT VISIT HI MDM: CPT | Performed by: EMERGENCY MEDICINE

## 2024-10-14 PROCEDURE — 81001 URINALYSIS AUTO W/SCOPE: CPT | Performed by: EMERGENCY MEDICINE

## 2024-10-14 PROCEDURE — 96365 THER/PROPH/DIAG IV INF INIT: CPT

## 2024-10-14 PROCEDURE — 85014 HEMATOCRIT: CPT | Performed by: EMERGENCY MEDICINE

## 2024-10-14 PROCEDURE — 96375 TX/PRO/DX INJ NEW DRUG ADDON: CPT

## 2024-10-14 RX ORDER — DOXYCYCLINE 100 MG/1
100 TABLET ORAL 2 TIMES DAILY
Qty: 14 TABLET | Refills: 0 | Status: SHIPPED | OUTPATIENT
Start: 2024-10-14 | End: 2024-10-21

## 2024-10-14 RX ORDER — ACETAMINOPHEN 10 MG/ML
1000 INJECTION, SOLUTION INTRAVENOUS ONCE
Status: COMPLETED | OUTPATIENT
Start: 2024-10-14 | End: 2024-10-14

## 2024-10-14 RX ORDER — METOCLOPRAMIDE HYDROCHLORIDE 5 MG/ML
10 INJECTION INTRAMUSCULAR; INTRAVENOUS ONCE
Status: COMPLETED | OUTPATIENT
Start: 2024-10-14 | End: 2024-10-14

## 2024-10-14 RX ORDER — DOXYCYCLINE 100 MG/1
100 CAPSULE ORAL ONCE
Status: COMPLETED | OUTPATIENT
Start: 2024-10-14 | End: 2024-10-14

## 2024-10-14 RX ORDER — MORPHINE SULFATE 4 MG/ML
4 INJECTION, SOLUTION INTRAMUSCULAR; INTRAVENOUS ONCE
Status: DISCONTINUED | OUTPATIENT
Start: 2024-10-14 | End: 2024-10-14

## 2024-10-14 RX ADMIN — IOHEXOL 100 ML: 350 INJECTION, SOLUTION INTRAVENOUS at 15:27

## 2024-10-14 RX ADMIN — ACETAMINOPHEN 1000 MG: 1000 INJECTION, SOLUTION INTRAVENOUS at 14:49

## 2024-10-14 RX ADMIN — DOXYCYCLINE HYCLATE 100 MG: 100 CAPSULE ORAL at 17:44

## 2024-10-14 RX ADMIN — METOCLOPRAMIDE 10 MG: 5 INJECTION, SOLUTION INTRAMUSCULAR; INTRAVENOUS at 14:29

## 2024-10-14 NOTE — ED PROVIDER NOTES
Time reflects when diagnosis was documented in both MDM as applicable and the Disposition within this note       Time User Action Codes Description Comment    10/14/2024  4:40 PM Ritz, Yasmany Casey Add [N83.209] Ruptured ovarian cyst     10/14/2024  4:40 PM Ritz, Yasmany Casey Add [K66.1] Hemoperitoneum     10/14/2024  5:42 PM Ritz, Yasmany Casey Add [J18.9] Pneumonia of left lung due to infectious organism     10/14/2024  5:42 PM Ritz, Yasmany Casey Remove [K66.1] Hemoperitoneum           ED Disposition       ED Disposition   Discharge    Condition   Stable    Date/Time   Mon Oct 14, 2024  5:42 PM    Comment   Katherine Yin discharge to home/self care.                   Assessment & Plan       Medical Decision Making  DDx including but not limited to: appendicitis, hepatitis, pancreatitis, colitis, enteritis, diverticulitis, pelvic pathology (ovarian cyst rupture, symptomatic ovarian cyst, etc.), renal colic, pyelonephritis, UTI.  Cbc/cmp/lipase/ua/upt and CT a/p with IV contrast.  Symptomatic management while workup ongoing.  Discussed patient's aspirin allergy with her.  She was advised to avoid all NSAIDs due to hypertension; she did in fact have a prior allergic reaction to aspirin although she believes that she has taken ibuprofen without issue.  Will assess her response to IV acetaminophen prior to administering NSAID.  Patient declined administration of any opioid analgesics.      Amount and/or Complexity of Data Reviewed  Labs: ordered. Decision-making details documented in ED Course.  Radiology: ordered. Decision-making details documented in ED Course.    Risk  Prescription drug management.        ED Course as of 10/14/24 1929   Mon Oct 14, 2024   1448 CBC and differential  WBC wnl  Hg/Hct wnl  Plt wnl   1453 POCT pregnancy, urine  Negative   1458 UA w Reflex to Microscopic w Reflex to Culture(!)  Trace protein  No markers of infection  Moderately concentrated   1503 Lipase  Normal   1503  CMP  Normal electrolytes and transaminases   1529 CT completed and awaiting interpretation   1535 Urine Microscopic(!)   1602 CT abdomen pelvis with contrast  IMPRESSION:     1. Nonspecific small volume evolving hemoperitoneum, which statistically is related to a ruptured right corpus luteal cyst.     2. Mild cystitis.     3. Left lower lobe tree-in-bud opacities, which may be infectious/inflammatory or be related to sequela of aspiration.     The study was marked in EPIC for immediate notification.        1609 CT results reviewed with the patient.  She does feel somewhat improved.  Reviewed typical course of bleeding secondary to cyst rupture and typical management strategies.  I will plan to repeat an H&H at the 2-hour nick to see that it has not dropped substantially in the setting of the hemoperitoneum identified on CT.  Discussed as much with patient who was agreeable to this plan.  She does have a gynecologist whom she can see for follow-up    As noted, has had a cough for at least the past week nonproductive, but increasing in severity over time with mild dyspnea.  No fevers.  No hemoptysis.  No pain with breathing.  CT findings do concord with this and warrant treatment given the prolonged nature of symptoms.   1713 Hemoglobin and hematocrit, blood  No change from prior; not concern for ongoing bleeding.     Repeat hemoglobin showed essentially no change from prior; very low concern for ongoing or significant bleeding.  Reviewed the typical course of symptoms in setting of ovarian cyst rupture with patient.  Has used acetaminophen previously which she certainly continue now.  Reasonable to trial other NSAID temporarily for sx given the above discussion regarding her tolerance of NSAIDs apart from aspirin.  She does have a gynecologist whom she can see for further evaluation.  Antibiotics prescribed given the left lower lobe abnormality on CT which certainly accounts for the respiratory symptoms she is  experiencing.  Given the symptoms have been worsening by her report, reasonable to treat as active infection as opposed to the sequela of recent infection. She is not having any respiratory distress, hypoxemia, nor any significant endorgan dysfunction, and I see no indication for hospitalization for this condition specifically.  All questions were answered to satisfaction of patient and family prior to discharge.      Medications   metoclopramide (REGLAN) injection 10 mg (10 mg Intravenous Given 10/14/24 1429)   acetaminophen (Ofirmev) injection 1,000 mg (0 mg Intravenous Stopped 10/14/24 1510)   iohexol (OMNIPAQUE) 350 MG/ML injection (MULTI-DOSE) 100 mL (100 mL Intravenous Given 10/14/24 1527)   doxycycline hyclate (VIBRAMYCIN) capsule 100 mg (100 mg Oral Given 10/14/24 1744)       ED Risk Strat Scores               SBIRT 20yo+      Flowsheet Row Most Recent Value   Initial Alcohol Screen: US AUDIT-C     1. How often do you have a drink containing alcohol? 0 Filed at: 10/14/2024 1402   2. How many drinks containing alcohol do you have on a typical day you are drinking?  0 Filed at: 10/14/2024 1402   3a. Male UNDER 65: How often do you have five or more drinks on one occasion? 0 Filed at: 10/14/2024 1402   3b. FEMALE Any Age, or MALE 65+: How often do you have 4 or more drinks on one occassion? 0 Filed at: 10/14/2024 1402   Audit-C Score 0 Filed at: 10/14/2024 1402   DMITRY: How many times in the past year have you...    Used an illegal drug or used a prescription medication for non-medical reasons? Never Filed at: 10/14/2024 1402              History of Present Illness       Chief Complaint   Patient presents with    Abdominal Pain     Patient reports sudden b/l lower abdominal pain with nausea beginning suddenly about 3 hours ago with nausea       Past Medical History:   Diagnosis Date    Hyperlipidemia     Hypertension       History reviewed. No pertinent surgical history.   History reviewed. No pertinent family  history.   Social History     Tobacco Use    Smoking status: Never    Smokeless tobacco: Never   Vaping Use    Vaping status: Never Used   Substance Use Topics    Alcohol use: Never    Drug use: Never      E-Cigarette/Vaping    E-Cigarette Use Never User       E-Cigarette/Vaping Substances      I have reviewed and agree with the history as documented.     39-year-old woman with noted past medical hx presents to the ED with rather abrupt onset of lower abdominal pain about 1030 this morning.  Pain began at rest with no preceding trauma/injury. Pain present in both the left and right lower quadrants and in the midline, worse when sitting upright.  Has felt nauseated since onset of pain but no episodes of vomiting.  Has urinated since onset of pain without dysuria/hematuria/urgency/frequency.  Urination did not change the pain.  Has not had a bowel movement since the onset of pain; had bowel movement this morning prior to onset of pain and states that it was normal with no difficulty passing it nor any blood/melena. She typically has frequent loose bowel movements since cholecystectomy.  Otherwise in normal state of health until symptom onset.  No prior history of similar symptoms.  Has had prior cholecystectomy and tubal ligation.  No antibiotic use in past 30 days.  No preceding trauma/injury.  Reports she has had prior ovarian cyst identified incidentally on pelvic ultrasound but never any cysts that caused symptoms of which she is aware.  She did take acetaminophen for pain prior to presenting to the emergency department.  No vaginal bleeding/discharge since onset of pain.   Independent of these symptoms has had cough for approximately the past 1.5 weeks.  Nonproductive.  No dyspnea.  No fevers at any point of which she is aware.  No pain with breathing.  Did not have identifiable sick contacts prior to onset of symptoms.  Does feel that the cough is worsening over time however.  Does not having underlying lung  disease.  Did not take any or use anything for cough specifically. Since development of abd pain this morning, cough does worsen the pain although abd pain did not develop while coughing.      History provided by:  Patient, medical records and relative   used: No (Patient's family interpreted for her during discussion)    Abdominal Pain  Associated symptoms: nausea    Associated symptoms: no constipation, no diarrhea, no dysuria, no fatigue, no fever, no hematuria and no vomiting        Review of Systems   Constitutional:  Negative for diaphoresis, fatigue and fever.   Respiratory: Negative.     Cardiovascular: Negative.    Gastrointestinal:  Positive for abdominal distention, abdominal pain and nausea. Negative for constipation, diarrhea and vomiting.   Genitourinary:  Positive for pelvic pain. Negative for difficulty urinating, dyspareunia, dysuria, flank pain, frequency, hematuria and urgency.   Musculoskeletal: Negative.    Skin: Negative.    Neurological:  Negative for syncope, weakness and light-headedness.   Hematological: Negative.            Objective       ED Triage Vitals [10/14/24 1400]   Temperature Pulse Blood Pressure Respirations SpO2 Patient Position - Orthostatic VS   97.6 °F (36.4 °C) 88 (!) 161/101 20 96 % Sitting      Temp Source Heart Rate Source BP Location FiO2 (%) Pain Score    Temporal Monitor Left arm -- 8      Vitals      Date and Time Temp Pulse SpO2 Resp BP Pain Score FACES Pain Rating User   10/14/24 1659 -- 78 97 % 20 166/107 -- -- BW   10/14/24 1400 97.6 °F (36.4 °C) 88 96 % 20 161/101 8 -- PP            Physical Exam  Vitals and nursing note reviewed.   Constitutional:       General: She is awake. She is in acute distress (moderate painful distress).      Appearance: Normal appearance. She is well-developed.   HENT:      Head: Normocephalic and atraumatic.      Right Ear: Hearing and external ear normal.      Left Ear: Hearing and external ear normal.   Neck:       Trachea: Trachea and phonation normal.   Cardiovascular:      Rate and Rhythm: Normal rate and regular rhythm.      Pulses:           Radial pulses are 2+ on the right side and 2+ on the left side.        Dorsalis pedis pulses are 2+ on the right side and 2+ on the left side.        Posterior tibial pulses are 2+ on the right side and 2+ on the left side.      Heart sounds: Normal heart sounds, S1 normal and S2 normal. No murmur heard.     No friction rub. No gallop.   Pulmonary:      Effort: Pulmonary effort is normal. No respiratory distress.      Breath sounds: Normal breath sounds. No stridor. No decreased breath sounds, wheezing, rhonchi or rales.   Abdominal:      General: There is no distension.      Palpations: There is no mass.      Tenderness: There is abdominal tenderness in the right lower quadrant, suprapubic area and left lower quadrant. There is no guarding or rebound.   Skin:     General: Skin is warm and dry.   Neurological:      Mental Status: She is alert and oriented to person, place, and time.      GCS: GCS eye subscore is 4. GCS verbal subscore is 5. GCS motor subscore is 6.      Cranial Nerves: No cranial nerve deficit.      Sensory: No sensory deficit.      Motor: No abnormal muscle tone.      Comments: PERRLA; EOMI. Sensation intact to light touch over face in V1-V3 distribution bilaterally. Facial expressions symmetric. Tongue/uvula midline. Shoulder shrug equal bilaterally. Strength 5/5 in UE/LE bilaterally. Sensation intact to light touch in UE/LE bilaterally.         Results Reviewed       Procedure Component Value Units Date/Time    Hemoglobin and hematocrit, blood [[id number]]  (Normal) Collected: 10/14/24 1654    Lab Status: Final result Specimen: Blood from Arm, Right Updated: 10/14/24 1707     Hemoglobin 12.8 g/dL      Hematocrit 38.3 %     Urine Microscopic [[id number]]  (Abnormal) Collected: 10/14/24 1448    Lab Status: Final result Specimen: Urine, Clean Catch Updated: 10/14/24  1532     RBC, UA 0-1 /hpf      WBC, UA 0-1 /hpf      Epithelial Cells Occasional /hpf      Bacteria, UA None Seen /hpf      MUCUS THREADS Occasional     Other Casts Waxy Casts    CMP [[id number]] Collected: 10/14/24 1429    Lab Status: Final result Specimen: Blood from Arm, Right Updated: 10/14/24 1500     Sodium 137 mmol/L      Potassium 3.6 mmol/L      Chloride 102 mmol/L      CO2 25 mmol/L      ANION GAP 10 mmol/L      BUN 14 mg/dL      Creatinine 0.76 mg/dL      Glucose 113 mg/dL      Calcium 9.5 mg/dL      AST 18 U/L      ALT 20 U/L      Alkaline Phosphatase 63 U/L      Total Protein 7.2 g/dL      Albumin 4.2 g/dL      Total Bilirubin 0.30 mg/dL      eGFR 99 ml/min/1.73sq m     Narrative:      National Kid[employer] Foundation guidelines for Chronic Kidney Disease (CKD):     Stage 1 with normal or high GFR (GFR > 90 mL/min/1.73 square meters)    Stage 2 Mild CKD (GFR = 60-89 mL/min/1.73 square meters)    Stage 3A Moderate CKD (GFR = 45-59 mL/min/1.73 square meters)    Stage 3B Moderate CKD (GFR = 30-44 mL/min/1.73 square meters)    Stage 4 Severe CKD (GFR = 15-29 mL/min/1.73 square meters)    Stage 5 End Stage CKD (GFR <15 mL/min/1.73 square meters)  Note: GFR calculation is accurate only with a steady state creatinine    Lipase [[id number]]  (Normal) Collected: 10/14/24 1429    Lab Status: Final result Specimen: Blood from Arm, Right Updated: 10/14/24 1500     Lipase 20 u/L     UA w Reflex to Microscopic w Reflex to Culture [[id number]]  (Abnormal) Collected: 10/14/24 1448    Lab Status: Final result Specimen: Urine, Clean Catch Updated: 10/14/24 1458     Color, UA Yellow     Clarity, UA Clear     Specific Gravity, UA 1.020     pH, UA 6.5     Leukocytes, UA Negative     Nitrite, UA Negative     Protein, UA Trace mg/dl      Glucose, UA Negative mg/dl      Ketones, UA Negative mg/dl      Urobilinogen, UA <2.0 mg/dl      Bilirubin, UA Negative     Occult Blood, UA Negative    POCT pregnancy, urine [[id number]]   (Normal) Resulted: 10/14/24 1452    Lab Status: Final result Updated: 10/14/24 1453     EXT Preg Test, Ur Negative     Control Valid    CBC and differential [[id number]] Collected: 10/14/24 1429    Lab Status: Final result Specimen: Blood from Arm, Right Updated: 10/14/24 1441     WBC 8.98 Thousand/uL      RBC 4.41 Million/uL      Hemoglobin 12.9 g/dL      Hematocrit 38.7 %      MCV 88 fL      MCH 29.3 pg      MCHC 33.3 g/dL      RDW 13.2 %      MPV 9.1 fL      Platelets 366 Thousands/uL      nRBC 0 /100 WBCs      Segmented % 60 %      Immature Grans % 1 %      Lymphocytes % 28 %      Monocytes % 7 %      Eosinophils Relative 3 %      Basophils Relative 1 %      Absolute Neutrophils 5.42 Thousands/µL      Absolute Immature Grans 0.05 Thousand/uL      Absolute Lymphocytes 2.53 Thousands/µL      Absolute Monocytes 0.62 Thousand/µL      Eosinophils Absolute 0.27 Thousand/µL      Basophils Absolute 0.09 Thousands/µL             CT abdomen pelvis with contrast   Final Interpretation by Óscar Christianson MD (10/14 1558)      1. Nonspecific small volume evolving hemoperitoneum, which statistically is related to a ruptured right corpus luteal cyst.      2. Mild cystitis.      3. Left lower lobe tree-in-bud opacities, which may be infectious/inflammatory or be related to sequela of aspiration.      The study was marked in EPIC for immediate notification.         Workstation performed: [id number]             Procedures    ED Medication and Procedure Management   Prior to Admission Medications   Prescriptions Last Dose Informant Patient Reported? Taking?   carvedilol (COREG) 6.25 mg tablet 10/14/2024  Yes Yes   Sig: Take 6.25 mg by mouth   diltiazem (Dilt-XR) 240 MG 24 hr capsule 10/14/2024  Yes Yes   Sig: Take 240 mg by mouth daily   losartan (COZAAR) 100 MG tablet 10/14/2024  Yes Yes   Sig: Take 100 mg by mouth daily      Facility-Administered Medications: None     Discharge Medication List as of 10/14/2024  5:46 PM         START taking these medications    Details   diclofenac sodium (VOLTAREN) 50 mg EC tablet Take 1 tablet (50 mg total) by mouth 2 (two) times a day, Starting Mon 10/14/2024, Normal      doxycycline (ADOXA) 100 MG tablet Take 1 tablet (100 mg total) by mouth 2 (two) times a day for 7 days, Starting Mon 10/14/2024, Until Mon 10/21/2024, Normal           CONTINUE these medications which have NOT CHANGED    Details   carvedilol (COREG) 6.25 mg tablet Take 6.25 mg by mouth, Historical Med      diltiazem (Dilt-XR) 240 MG 24 hr capsule Take 240 mg by mouth daily, Historical Med      losartan (COZAAR) 100 MG tablet Take 100 mg by mouth daily, Historical Med           No discharge procedures on file.  ED SEPSIS DOCUMENTATION   Time reflects when diagnosis was documented in both MDM as applicable and the Disposition within this note       Time User Action Codes Description Comment    10/14/2024  4:40 PM Yasmany Rust Add [N83.209] Ruptured ovarian cyst     10/14/2024  4:40 PM Yasmany Rust Add [K66.1] Hemoperitoneum     10/14/2024  5:42 PM Yasmany Rust Add [J18.9] Pneumonia of left lung due to infectious organism     10/14/2024  5:42 PM Yasmany Rust Remove [K66.1] Hemoperitoneum                  Yasmany Rust DO  10/14/24 1938

## 2024-10-14 NOTE — DISCHARGE INSTRUCTIONS
Comuníquese con snyder ginecólogo para programar joanne sylvia.    Por favor tome el antibiótico isabel todo el ciclo.    Si desarrolla un dolor incontrolable, se desmaya o le falta el aire, vaya a la emerita de emergencias de inmediato.

## 2024-10-16 LAB
ATRIAL RATE: 82 BPM
P AXIS: 54 DEGREES
PR INTERVAL: 144 MS
QRS AXIS: 67 DEGREES
QRSD INTERVAL: 82 MS
QT INTERVAL: 398 MS
QTC INTERVAL: 464 MS
T WAVE AXIS: 5 DEGREES
VENTRICULAR RATE: 82 BPM

## 2024-10-16 PROCEDURE — 93010 ELECTROCARDIOGRAM REPORT: CPT | Performed by: INTERNAL MEDICINE

## 2025-08-04 ENCOUNTER — HOSPITAL ENCOUNTER (EMERGENCY)
Facility: HOSPITAL | Age: 40
Discharge: HOME | End: 2025-08-05
Admitting: EMERGENCY MEDICINE
Payer: COMMERCIAL

## 2025-08-04 ENCOUNTER — APPOINTMENT (EMERGENCY)
Dept: RADIOLOGY | Facility: HOSPITAL | Age: 40
End: 2025-08-04
Payer: COMMERCIAL